# Patient Record
Sex: FEMALE | Race: OTHER | NOT HISPANIC OR LATINO | URBAN - METROPOLITAN AREA
[De-identification: names, ages, dates, MRNs, and addresses within clinical notes are randomized per-mention and may not be internally consistent; named-entity substitution may affect disease eponyms.]

---

## 2021-12-12 ENCOUNTER — EMERGENCY (EMERGENCY)
Facility: HOSPITAL | Age: 36
LOS: 1 days | Discharge: HOME | End: 2021-12-12
Attending: STUDENT IN AN ORGANIZED HEALTH CARE EDUCATION/TRAINING PROGRAM
Payer: MEDICAID

## 2021-12-12 VITALS
WEIGHT: 145.06 LBS | TEMPERATURE: 99 F | DIASTOLIC BLOOD PRESSURE: 84 MMHG | OXYGEN SATURATION: 99 % | HEIGHT: 62 IN | SYSTOLIC BLOOD PRESSURE: 127 MMHG | HEART RATE: 85 BPM | RESPIRATION RATE: 18 BRPM

## 2021-12-12 VITALS
SYSTOLIC BLOOD PRESSURE: 116 MMHG | RESPIRATION RATE: 16 BRPM | HEART RATE: 71 BPM | TEMPERATURE: 98 F | DIASTOLIC BLOOD PRESSURE: 74 MMHG | OXYGEN SATURATION: 99 %

## 2021-12-12 DIAGNOSIS — R45.89 OTHER SYMPTOMS AND SIGNS INVOLVING EMOTIONAL STATE: ICD-10-CM

## 2021-12-12 LAB
ALBUMIN SERPL ELPH-MCNC: 4.3 G/DL — SIGNIFICANT CHANGE UP (ref 3.3–5.2)
ALP SERPL-CCNC: 99 U/L — SIGNIFICANT CHANGE UP (ref 40–120)
ALT FLD-CCNC: 12 U/L — SIGNIFICANT CHANGE UP
ANION GAP SERPL CALC-SCNC: 12 MMOL/L — SIGNIFICANT CHANGE UP (ref 5–17)
APAP SERPL-MCNC: <3 UG/ML — LOW (ref 10–26)
AST SERPL-CCNC: 7 U/L — SIGNIFICANT CHANGE UP
BASOPHILS # BLD AUTO: 0.05 K/UL — SIGNIFICANT CHANGE UP (ref 0–0.2)
BASOPHILS NFR BLD AUTO: 0.6 % — SIGNIFICANT CHANGE UP (ref 0–2)
BILIRUB SERPL-MCNC: 0.2 MG/DL — LOW (ref 0.4–2)
BUN SERPL-MCNC: 7.3 MG/DL — LOW (ref 8–20)
CALCIUM SERPL-MCNC: 8.8 MG/DL — SIGNIFICANT CHANGE UP (ref 8.6–10.2)
CHLORIDE SERPL-SCNC: 104 MMOL/L — SIGNIFICANT CHANGE UP (ref 98–107)
CO2 SERPL-SCNC: 22 MMOL/L — SIGNIFICANT CHANGE UP (ref 22–29)
CREAT SERPL-MCNC: 0.58 MG/DL — SIGNIFICANT CHANGE UP (ref 0.5–1.3)
EOSINOPHIL # BLD AUTO: 0.04 K/UL — SIGNIFICANT CHANGE UP (ref 0–0.5)
EOSINOPHIL NFR BLD AUTO: 0.4 % — SIGNIFICANT CHANGE UP (ref 0–6)
ETHANOL SERPL-MCNC: <10 MG/DL — SIGNIFICANT CHANGE UP (ref 0–9)
GLUCOSE SERPL-MCNC: 96 MG/DL — SIGNIFICANT CHANGE UP (ref 70–99)
HCG SERPL-ACNC: <4 MIU/ML — SIGNIFICANT CHANGE UP
HCT VFR BLD CALC: 40.1 % — SIGNIFICANT CHANGE UP (ref 34.5–45)
HGB BLD-MCNC: 14 G/DL — SIGNIFICANT CHANGE UP (ref 11.5–15.5)
IMM GRANULOCYTES NFR BLD AUTO: 0.3 % — SIGNIFICANT CHANGE UP (ref 0–1.5)
LYMPHOCYTES # BLD AUTO: 2.4 K/UL — SIGNIFICANT CHANGE UP (ref 1–3.3)
LYMPHOCYTES # BLD AUTO: 26.8 % — SIGNIFICANT CHANGE UP (ref 13–44)
MCHC RBC-ENTMCNC: 30.2 PG — SIGNIFICANT CHANGE UP (ref 27–34)
MCHC RBC-ENTMCNC: 34.9 GM/DL — SIGNIFICANT CHANGE UP (ref 32–36)
MCV RBC AUTO: 86.6 FL — SIGNIFICANT CHANGE UP (ref 80–100)
MONOCYTES # BLD AUTO: 0.73 K/UL — SIGNIFICANT CHANGE UP (ref 0–0.9)
MONOCYTES NFR BLD AUTO: 8.2 % — SIGNIFICANT CHANGE UP (ref 2–14)
NEUTROPHILS # BLD AUTO: 5.69 K/UL — SIGNIFICANT CHANGE UP (ref 1.8–7.4)
NEUTROPHILS NFR BLD AUTO: 63.7 % — SIGNIFICANT CHANGE UP (ref 43–77)
PLATELET # BLD AUTO: 340 K/UL — SIGNIFICANT CHANGE UP (ref 150–400)
POTASSIUM SERPL-MCNC: 3.6 MMOL/L — SIGNIFICANT CHANGE UP (ref 3.5–5.3)
POTASSIUM SERPL-SCNC: 3.6 MMOL/L — SIGNIFICANT CHANGE UP (ref 3.5–5.3)
PROT SERPL-MCNC: 7 G/DL — SIGNIFICANT CHANGE UP (ref 6.6–8.7)
RBC # BLD: 4.63 M/UL — SIGNIFICANT CHANGE UP (ref 3.8–5.2)
RBC # FLD: 12 % — SIGNIFICANT CHANGE UP (ref 10.3–14.5)
SALICYLATES SERPL-MCNC: <0.6 MG/DL — LOW (ref 10–20)
SODIUM SERPL-SCNC: 138 MMOL/L — SIGNIFICANT CHANGE UP (ref 135–145)
WBC # BLD: 8.94 K/UL — SIGNIFICANT CHANGE UP (ref 3.8–10.5)
WBC # FLD AUTO: 8.94 K/UL — SIGNIFICANT CHANGE UP (ref 3.8–10.5)

## 2021-12-12 PROCEDURE — 80053 COMPREHEN METABOLIC PANEL: CPT

## 2021-12-12 PROCEDURE — 85025 COMPLETE CBC W/AUTO DIFF WBC: CPT

## 2021-12-12 PROCEDURE — 82962 GLUCOSE BLOOD TEST: CPT

## 2021-12-12 PROCEDURE — 84702 CHORIONIC GONADOTROPIN TEST: CPT

## 2021-12-12 PROCEDURE — 99284 EMERGENCY DEPT VISIT MOD MDM: CPT | Mod: 25

## 2021-12-12 PROCEDURE — 70450 CT HEAD/BRAIN W/O DYE: CPT | Mod: MA

## 2021-12-12 PROCEDURE — 71045 X-RAY EXAM CHEST 1 VIEW: CPT | Mod: 26

## 2021-12-12 PROCEDURE — T1013: CPT

## 2021-12-12 PROCEDURE — 70450 CT HEAD/BRAIN W/O DYE: CPT | Mod: 26,MA

## 2021-12-12 PROCEDURE — 90792 PSYCH DIAG EVAL W/MED SRVCS: CPT

## 2021-12-12 PROCEDURE — 99283 EMERGENCY DEPT VISIT LOW MDM: CPT

## 2021-12-12 PROCEDURE — 80307 DRUG TEST PRSMV CHEM ANLYZR: CPT

## 2021-12-12 PROCEDURE — 36415 COLL VENOUS BLD VENIPUNCTURE: CPT

## 2021-12-12 PROCEDURE — 71045 X-RAY EXAM CHEST 1 VIEW: CPT

## 2021-12-12 RX ORDER — SODIUM CHLORIDE 9 MG/ML
1000 INJECTION INTRAMUSCULAR; INTRAVENOUS; SUBCUTANEOUS ONCE
Refills: 0 | Status: COMPLETED | OUTPATIENT
Start: 2021-12-12 | End: 2021-12-12

## 2021-12-12 RX ADMIN — SODIUM CHLORIDE 1000 MILLILITER(S): 9 INJECTION INTRAMUSCULAR; INTRAVENOUS; SUBCUTANEOUS at 14:44

## 2021-12-12 NOTE — ED PROVIDER NOTE - OBJECTIVE STATEMENT
35yo F unknown pmhx pw ams. as per ems notes that pt called a friend and a friend called the ambulance. pts roomates saw a demi exit her room earlier this morning? pt denies taking drugs/etoh. denies SI. states that takes sleeping pills (Foskrol). denies taking extra. falls asleep during conversation ; moving all extremeties. admits to mild ha.  Denies f/c/n/v/cp/sob/palpitations/ cough/rash//dizziness/abd.pain/d/c/dysuria/hematuria

## 2021-12-12 NOTE — ED BEHAVIORAL HEALTH ASSESSMENT NOTE - NSSUICPROTFACT_PSY_ALL_CORE
Responsibility to children, family, or others/Identifies reasons for living/Supportive social network of family or friends/Fear of death or the actual act of killing self/Cultural, spiritual and/or moral attitudes against suicide

## 2021-12-12 NOTE — ED ADULT NURSE REASSESSMENT NOTE - NS ED NURSE REASSESS COMMENT FT1
Assumed care of patient from previous RN. Patient to be evaluated by .  at bedside. Patient protecting airway, calm and cooperative but is requesting to leave. Safety maintained, needs attended, will continue to monitor.

## 2021-12-12 NOTE — ED PROVIDER NOTE - PATIENT PORTAL LINK FT
You can access the FollowMyHealth Patient Portal offered by [facility] by registering at the following website: [email or web address]. By joining [facility]’s FollowMyHealth portal, you will also be able to view your health information using other applications (apps) compatible with our system.

## 2021-12-12 NOTE — ED BEHAVIORAL HEALTH ASSESSMENT NOTE - DESCRIPTION
none T(C): 37.3 (12-12-21 @ 13:43), Max: 37.3 (12-12-21 @ 13:43)  T(F): 99.2 (12-12-21 @ 13:43), Max: 99.2 (12-12-21 @ 13:43)  HR: 85 (12-12-21 @ 13:43) (85 - 85)  BP: 127/84 (12-12-21 @ 13:43) (127/84 - 127/84)  RR: 18 (12-12-21 @ 13:43) (18 - 18)  SpO2: 99% (12-12-21 @ 13:43) (99% - 99%) moved to [place] 6 mo ago with  and 3 children, employed in nursery

## 2021-12-12 NOTE — ED PROVIDER NOTE - CLINICAL SUMMARY MEDICAL DECISION MAKING FREE TEXT BOX
ams; denies drug/alcohol; denies si; very sleepy denies any symptoms besides mild headache will do priority ct; labs hydrate ekg --reassess

## 2021-12-12 NOTE — ED PROVIDER NOTE - PROGRESS NOTE DETAILS
bran: pt aao x 3 denies si notes that has been feeling stressed lately and took 3-4 pills of foskrol suppliments that she takes for sleep; no intention to hurt herself; asking to speak to psych for anxiety/depression; no complaints currently As per signout from Dr. Vasques, patient pending psychiatric consultation.

## 2021-12-12 NOTE — ED ADULT TRIAGE NOTE - CHIEF COMPLAINT QUOTE
patient was brought in unresponsive at home, responsive to painful stimuli, GCS of 12. MD called to bedside Priority CT called. no sign o f alisiam
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
## 2021-12-12 NOTE — CHART NOTE - NSCHARTNOTEFT_GEN_A_CORE
SW Note: SW made aware by  provider pt is T&R, would benefit from outpt f/u. SW met with pt at bedside with  reyna Bassett in agreement to intake at [facility], aware intake is via phone. Appt made for 12/14 at 1pm. # for appt is [phone]. Pt provided with appt card and brochure, no further SW services identified

## 2021-12-12 NOTE — ED PROVIDER NOTE - NSFOLLOWUPINSTRUCTIONS_ED_ALL_ED_FT
1) Seguimiento según lo recomendado  2) Regrese a la burt de emergencias si los síntomas empeoran o le preocupan    1) Follow up as recommended  2) Return to the ER for worsening or concerning symptoms      La depresión    LO QUE NECESITA SABER:    La depresión es un trastorno médico que causa sentimientos de tristeza o desesperanza que no desaparecen. La depresión puede causar que usted pierda interés en las cosas que antes disfrutaba. Estos sentimientos pueden llegar a interferir con brown abimael diaria.    INSTRUCCIONES SOBRE EL ANUP HOSPITALARIA:    Llame al número de emergencias local (911 en [place]) si:  •Usted piensa en lastimarse o lastimar a alguien más.      •Usted ha hecho algo a propósito para hacerse daño.      Llame a brown terapeuta o médico si:  •Yareli síntomas no mejoran.      •No puede asistir a brown próxima tanika.      •Usted tiene síntomas nuevos.      •Usted tiene preguntas o inquietudes acerca de brown condición o cuidado.      Los siguientes recursos están disponibles en cualquier momento para ayudarlo, si es necesario:  •National Suicide Prevention Lifeline (línea de prevención del suicidio): 1-683.686.5479 (5-014-354-TALK)      •Teléfono directo para hablar de suicidio: 1-534.136.4054 (0-733-PPSOLKO)      •Para obtener shawn lista de números internacionales: https://save.org/find-help/international-resources/      Medicamentos:  •Antidepresivospueden darse para mejorar o balancear brown estado de ánimo. Es posible que usted necesite marilee timothy medicamento por varias semanas antes de empezar a sentirse mejor.      •[place] yareli medicamentos verito se le haya indicado.Consulte con brown médico si usted miley que brown medicamento no le está ayudando o si presenta efectos secundarios. Infórmele si es alérgico a algún medicamento. Mantenga shawn lista actualizada de los medicamentos, las vitaminas y los productos herbales que mercedez. Incluya los siguientes datos de los medicamentos: cantidad, frecuencia y motivo de administración. Traiga con usted la lista o los envases de las píldoras a yareli citas de seguimiento. Lleve la lista de los medicamentos con usted en terri de shawn emergencia.      La terapiase utiliza a menudo junto con medicamentos para aliviar la depresión. La terapia es un lugar para hablar sobre yareli sentimientos y todo lo que puede estar causando la depresión. La terapia se puede realizar salomon o en alfonzo. También podría realizarse con yareli familiares o con brown miguel.    Cuidados personales:  •Realice actividad física regularmente.Trate de mantenerse activo por 30 minutos, de 3 a 5 días a la semana. La actividad física puede ayudar a aliviar la depresión. Colabore con brown médico para crear un plan de ejercicios que usted disfrute. Puede ayudarlo si le pide a alguien que se mantenga activo con usted.      •Establezca un horario regular para dormir.Shawn rutina puede ayudarlo a relajarse antes de irse a dormir. Escuche música, seth o sadia yoga. Trate de irse a dormir y despertarse al mismo tiempo todos los días. El sueño es importante para la ever emocional.      •Consuma alimentos saludables y variados.Los alimentos saludables incluyen frutas, verduras, panes integrales, productos lácteos descremados, geraldo magras, pescado y fríjoles. Un plan alimenticio saludable es bajo en grasas, sal y azúcar adicional.      •No tome alcohol ni use drogas.El alcohol y las drogas pueden empeorar la depresión. Hable con brown terapeuta o médico si usted necesita ayuda para dejar de fumar.      Acuda a yareli consultas de control con brown médico según le indicaron:Brown médico vigilará brown progreso en las citas de seguimiento. También monitoreará brown medicamento si usted está tomando antidepresivos. Brown médico le preguntará si el medicamento le está ayudando. Dígale sobre cualquier efecto secundario o problemas que usted tenga con brown medicamento. Podría ser necesario cambiar el tipo o cantidad de medicamento. Anote yareli preguntas para que se acuerde de hacerlas fadumo yareli visitas.      Depression    WHAT YOU NEED TO KNOW:    Depression is a medical condition that causes feelings of sadness or hopelessness that do not go away. Depression may cause you to lose interest in things you used to enjoy. These feelings may interfere with your daily life.    DISCHARGE INSTRUCTIONS:    Call your local emergency number (911 in the ) if:   •You think about harming yourself or someone else.      •You have done something on purpose to hurt yourself.      Call your therapist or doctor if:   •Your symptoms do not improve.      •You cannot make it to your next appointment.       •You have new symptoms.      •You have questions or concerns about your condition or care.      The following resources are available at any time to help you, if needed:   •National Suicide Prevention Lifeline: 1-619.753.5910 (7-407-983-TALK)      •Suicide Hotline: 1-467.877.6605 (3-089-REQTUDD)      •For a list of international numbers: https://save.org/find-help/international-resources/      Medicines:   •Antidepressants may be given to improve or balance your mood. You may need to take this medicine for several weeks before you begin to feel better.      •Take your medicine as directed. Contact your healthcare provider if you think your medicine is not helping or if you have side effects. Tell him of her if you are allergic to any medicine. Keep a list of the medicines, vitamins, and herbs you take. Include the amounts, and when and why you take them. Bring the list or the pill bottles to follow-up visits. Carry your medicine list with you in case of an emergency.      Therapy is often used together with medicine to relieve depression. Therapy is a way for you to talk about your feelings and anything that may be causing depression. Therapy can be done alone or in a group. It may also be done with family members or a significant other.    Self-care:   •Get regular physical activity. Try to be active for 30 minutes, 3 to 5 days a week. Physical activity can help relieve depression. Work with your healthcare provider to develop a plan that you enjoy. It may help to ask someone to be active with you.      •Create a regular sleep schedule. A routine can help you relax before bed. Listen to music, read, or do yoga. Try to go to bed and wake up at the same time every day. Sleep is important for emotional health.      •Eat a variety of healthy foods. Healthy foods include fruits, vegetables, whole-grain breads, low-fat dairy products, lean meats, fish, and cooked beans. A healthy meal plan is low in fat, salt, and added sugar.      •Do not drink alcohol or use drugs. Alcohol and drugs can make depression worse. Talk to your therapist or doctor if you need help quitting.      Follow up with your healthcare provider as directed: Your healthcare provider will monitor your progress at follow-up visits. He or she will also monitor your medicine if you take antidepressants. Your healthcare provider will ask if the medicine is helping. Tell him or her about any side effects or problems you may have with your medicine. The type or amount of medicine may need to be changed. Write down your questions so you remember to ask them during your visits.

## 2021-12-12 NOTE — ED PROVIDER NOTE - PHYSICAL EXAMINATION
Head: atraumatic, normacephalic  Face: atraumatic, no crepitus no orbiral/maxillary/mandibular ttp  throat: uvula midline no exudates  eyes: perrla eomi  heart: rrr s1s2  lungs: ctab  abd: soft, nt nd +bs no rebound/guarding no cva ttp  skin: warm  LE: no swelling, no calf ttp  back: no midline cervical/thoracic/lumbar ttp  neuro: moving all extremeties

## 2022-03-27 ENCOUNTER — EMERGENCY (EMERGENCY)
Facility: HOSPITAL | Age: 37
LOS: 1 days | Discharge: HOME | End: 2022-03-27
Attending: EMERGENCY MEDICINE
Payer: MEDICAID

## 2022-03-27 VITALS
TEMPERATURE: 97 F | HEART RATE: 100 BPM | SYSTOLIC BLOOD PRESSURE: 113 MMHG | RESPIRATION RATE: 17 BRPM | WEIGHT: 145.95 LBS | HEIGHT: 62 IN | OXYGEN SATURATION: 99 % | DIASTOLIC BLOOD PRESSURE: 78 MMHG

## 2022-03-27 VITALS
TEMPERATURE: 98 F | SYSTOLIC BLOOD PRESSURE: 110 MMHG | DIASTOLIC BLOOD PRESSURE: 60 MMHG | RESPIRATION RATE: 22 BRPM | OXYGEN SATURATION: 100 % | HEART RATE: 94 BPM

## 2022-03-27 LAB
ALBUMIN SERPL ELPH-MCNC: 4.4 G/DL — SIGNIFICANT CHANGE UP (ref 3.3–5.2)
ALP SERPL-CCNC: 94 U/L — SIGNIFICANT CHANGE UP (ref 40–120)
ALT FLD-CCNC: 12 U/L — SIGNIFICANT CHANGE UP
ANION GAP SERPL CALC-SCNC: 16 MMOL/L — SIGNIFICANT CHANGE UP (ref 5–17)
APPEARANCE UR: ABNORMAL
AST SERPL-CCNC: 12 U/L — SIGNIFICANT CHANGE UP
BACTERIA # UR AUTO: ABNORMAL
BASOPHILS # BLD AUTO: 0.03 K/UL — SIGNIFICANT CHANGE UP (ref 0–0.2)
BASOPHILS NFR BLD AUTO: 0.3 % — SIGNIFICANT CHANGE UP (ref 0–2)
BILIRUB SERPL-MCNC: 0.4 MG/DL — SIGNIFICANT CHANGE UP (ref 0.4–2)
BILIRUB UR-MCNC: NEGATIVE — SIGNIFICANT CHANGE UP
BUN SERPL-MCNC: 9.1 MG/DL — SIGNIFICANT CHANGE UP (ref 8–20)
CALCIUM SERPL-MCNC: 9 MG/DL — SIGNIFICANT CHANGE UP (ref 8.6–10.2)
CHLORIDE SERPL-SCNC: 101 MMOL/L — SIGNIFICANT CHANGE UP (ref 98–107)
CO2 SERPL-SCNC: 21 MMOL/L — LOW (ref 22–29)
COLOR SPEC: YELLOW — SIGNIFICANT CHANGE UP
CREAT SERPL-MCNC: 0.57 MG/DL — SIGNIFICANT CHANGE UP (ref 0.5–1.3)
DIFF PNL FLD: ABNORMAL
EGFR: 121 ML/MIN/1.73M2 — SIGNIFICANT CHANGE UP
EOSINOPHIL # BLD AUTO: 0.04 K/UL — SIGNIFICANT CHANGE UP (ref 0–0.5)
EOSINOPHIL NFR BLD AUTO: 0.4 % — SIGNIFICANT CHANGE UP (ref 0–6)
EPI CELLS # UR: ABNORMAL
GLUCOSE SERPL-MCNC: 101 MG/DL — HIGH (ref 70–99)
GLUCOSE UR QL: NEGATIVE — SIGNIFICANT CHANGE UP
HCG SERPL-ACNC: <4 MIU/ML — SIGNIFICANT CHANGE UP
HCT VFR BLD CALC: 41.9 % — SIGNIFICANT CHANGE UP (ref 34.5–45)
HGB BLD-MCNC: 14.5 G/DL — SIGNIFICANT CHANGE UP (ref 11.5–15.5)
IMM GRANULOCYTES NFR BLD AUTO: 0.4 % — SIGNIFICANT CHANGE UP (ref 0–1.5)
KETONES UR-MCNC: ABNORMAL
LEUKOCYTE ESTERASE UR-ACNC: ABNORMAL
LIDOCAIN IGE QN: 18 U/L — LOW (ref 22–51)
LYMPHOCYTES # BLD AUTO: 2.39 K/UL — SIGNIFICANT CHANGE UP (ref 1–3.3)
LYMPHOCYTES # BLD AUTO: 21.5 % — SIGNIFICANT CHANGE UP (ref 13–44)
MCHC RBC-ENTMCNC: 30.7 PG — SIGNIFICANT CHANGE UP (ref 27–34)
MCHC RBC-ENTMCNC: 34.6 GM/DL — SIGNIFICANT CHANGE UP (ref 32–36)
MCV RBC AUTO: 88.6 FL — SIGNIFICANT CHANGE UP (ref 80–100)
MONOCYTES # BLD AUTO: 0.68 K/UL — SIGNIFICANT CHANGE UP (ref 0–0.9)
MONOCYTES NFR BLD AUTO: 6.1 % — SIGNIFICANT CHANGE UP (ref 2–14)
NEUTROPHILS # BLD AUTO: 7.94 K/UL — HIGH (ref 1.8–7.4)
NEUTROPHILS NFR BLD AUTO: 71.3 % — SIGNIFICANT CHANGE UP (ref 43–77)
NITRITE UR-MCNC: POSITIVE
PH UR: 6 — SIGNIFICANT CHANGE UP (ref 5–8)
PLATELET # BLD AUTO: 384 K/UL — SIGNIFICANT CHANGE UP (ref 150–400)
POTASSIUM SERPL-MCNC: 4.5 MMOL/L — SIGNIFICANT CHANGE UP (ref 3.5–5.3)
POTASSIUM SERPL-SCNC: 4.5 MMOL/L — SIGNIFICANT CHANGE UP (ref 3.5–5.3)
PROT SERPL-MCNC: 7.6 G/DL — SIGNIFICANT CHANGE UP (ref 6.6–8.7)
PROT UR-MCNC: NEGATIVE — SIGNIFICANT CHANGE UP
RBC # BLD: 4.73 M/UL — SIGNIFICANT CHANGE UP (ref 3.8–5.2)
RBC # FLD: 12.3 % — SIGNIFICANT CHANGE UP (ref 10.3–14.5)
RBC CASTS # UR COMP ASSIST: >50 /HPF (ref 0–4)
SODIUM SERPL-SCNC: 137 MMOL/L — SIGNIFICANT CHANGE UP (ref 135–145)
SP GR SPEC: 1.02 — SIGNIFICANT CHANGE UP (ref 1.01–1.02)
UROBILINOGEN FLD QL: NEGATIVE — SIGNIFICANT CHANGE UP
WBC # BLD: 11.12 K/UL — HIGH (ref 3.8–10.5)
WBC # FLD AUTO: 11.12 K/UL — HIGH (ref 3.8–10.5)
WBC UR QL: SIGNIFICANT CHANGE UP /HPF (ref 0–5)

## 2022-03-27 PROCEDURE — 85025 COMPLETE CBC W/AUTO DIFF WBC: CPT

## 2022-03-27 PROCEDURE — 80053 COMPREHEN METABOLIC PANEL: CPT

## 2022-03-27 PROCEDURE — 83690 ASSAY OF LIPASE: CPT

## 2022-03-27 PROCEDURE — 81001 URINALYSIS AUTO W/SCOPE: CPT

## 2022-03-27 PROCEDURE — 96361 HYDRATE IV INFUSION ADD-ON: CPT

## 2022-03-27 PROCEDURE — 36415 COLL VENOUS BLD VENIPUNCTURE: CPT

## 2022-03-27 PROCEDURE — 96375 TX/PRO/DX INJ NEW DRUG ADDON: CPT

## 2022-03-27 PROCEDURE — 99285 EMERGENCY DEPT VISIT HI MDM: CPT

## 2022-03-27 PROCEDURE — 87086 URINE CULTURE/COLONY COUNT: CPT

## 2022-03-27 PROCEDURE — 96374 THER/PROPH/DIAG INJ IV PUSH: CPT

## 2022-03-27 PROCEDURE — 99284 EMERGENCY DEPT VISIT MOD MDM: CPT | Mod: 25

## 2022-03-27 PROCEDURE — 84702 CHORIONIC GONADOTROPIN TEST: CPT

## 2022-03-27 PROCEDURE — 76705 ECHO EXAM OF ABDOMEN: CPT

## 2022-03-27 PROCEDURE — 76705 ECHO EXAM OF ABDOMEN: CPT | Mod: 26

## 2022-03-27 RX ORDER — ONDANSETRON 8 MG/1
4 TABLET, FILM COATED ORAL ONCE
Refills: 0 | Status: COMPLETED | OUTPATIENT
Start: 2022-03-27 | End: 2022-03-27

## 2022-03-27 RX ORDER — MORPHINE SULFATE 50 MG/1
2 CAPSULE, EXTENDED RELEASE ORAL ONCE
Refills: 0 | Status: DISCONTINUED | OUTPATIENT
Start: 2022-03-27 | End: 2022-03-27

## 2022-03-27 RX ORDER — SODIUM CHLORIDE 9 MG/ML
1000 INJECTION INTRAMUSCULAR; INTRAVENOUS; SUBCUTANEOUS ONCE
Refills: 0 | Status: COMPLETED | OUTPATIENT
Start: 2022-03-27 | End: 2022-03-27

## 2022-03-27 RX ORDER — CEPHALEXIN 500 MG
1 CAPSULE ORAL
Qty: 14 | Refills: 0
Start: 2022-03-27 | End: 2022-04-02

## 2022-03-27 RX ADMIN — SODIUM CHLORIDE 1000 MILLILITER(S): 9 INJECTION INTRAMUSCULAR; INTRAVENOUS; SUBCUTANEOUS at 12:24

## 2022-03-27 RX ADMIN — ONDANSETRON 4 MILLIGRAM(S): 8 TABLET, FILM COATED ORAL at 12:25

## 2022-03-27 RX ADMIN — MORPHINE SULFATE 2 MILLIGRAM(S): 50 CAPSULE, EXTENDED RELEASE ORAL at 12:24

## 2022-03-27 RX ADMIN — MORPHINE SULFATE 2 MILLIGRAM(S): 50 CAPSULE, EXTENDED RELEASE ORAL at 12:40

## 2022-03-27 RX ADMIN — SODIUM CHLORIDE 1000 MILLILITER(S): 9 INJECTION INTRAMUSCULAR; INTRAVENOUS; SUBCUTANEOUS at 13:30

## 2022-03-27 NOTE — ED ADULT NURSE NOTE - OBJECTIVE STATEMENT
Pt c/o abdominal pain for 4 days. Pt woke up with pain at 8/10 with nausea and vomiting. Denies diarrhea.

## 2022-03-27 NOTE — ED PROVIDER NOTE - NS ED ATTENDING STATEMENT MOD
This was a shared visit with the SANDRA. I reviewed and verified the documentation and independently performed the documented:

## 2022-03-27 NOTE — ED PROVIDER NOTE - OBJECTIVE STATEMENT
36 yo F Pt, no PmHX, presents to ED complaining of upper abdominal pain x 4 days. Pt states she has upper abdominal pain, with associated nausea and vomiting. Pt admits to additional lower abdominal cramping but states its likely due to her period. Pt also has associated fevers, unknown temp. Pt denies diarrhea, blood in stool, constipation, urinary symptoms, head ache, chest pain, SOB< and any other acute symptoms at this time.

## 2022-03-27 NOTE — ED PROVIDER NOTE - PATIENT PORTAL LINK FT
You can access the FollowMyHealth Patient Portal offered by [facility] by registering at the following website: [email or web address]. By joining [facility]’s FollowMyHealth portal, you will also be able to view your health information using other applications (apps) compatible with our system.

## 2022-03-27 NOTE — ED PROVIDER NOTE - ATTENDING CONTRIBUTION TO CARE
Je HARPER- 38 Y/O F WITH H/O TUBAL LIGATION p/w rt upper abd pain with nausea, vomiting for 4 days, no fever, chills, dysuria. Pt started on her periods 2 days ago and also has mild pelvic pain like her periods. No h/o gallstones, denies pregnancy. No recent travel or sick contact.    Pt is alert, well appearing female, s1s2 normal reg, b/l clear breath sounds, abd soft, nt, nd, neg Castellanos's sign, normal bowel sounds, no cvat b/l, neuro exam aox3, cn 2-12 intact, no focal deficits, skin warm, dry, good turgor    plan to do us gallbladder, check labs, treat symptomatically, ddx includes biliary colic, pancreatitis, gastritis, dysmenorrhea

## 2022-03-27 NOTE — ED PROVIDER NOTE - NSFOLLOWUPINSTRUCTIONS_ED_ALL_ED_FT
Urinary Tract Infection    A urinary tract infection (UTI) is an infection of any part of the urinary tract, which includes the kidneys, ureters, bladder, and urethra. Risk factors include ignoring your need to urinate, wiping back to front if female, being an uncircumcised male, and having diabetes or a weak immune system. Symptoms include frequent urination, pain or burning with urination, foul smelling urine, cloudy urine, pain in the lower abdomen, blood in the urine, and fever. If you were prescribed an antibiotic medicine, take it as told by your health care provider. Do not stop taking the antibiotic even if you start to feel better.    SEEK IMMEDIATE MEDICAL CARE IF YOU HAVE ANY OF THE FOLLOWING SYMPTOMS: severe back or abdominal pain, fever, inability to keep fluids or medicine down, dizziness/lightheadedness, or a change in mental status.    Please return to the emergency department immediately should you feel worse in any way or have any of the following symptoms:    •	especially increased or different pain  •	 fevers  •	persistent vomiting  •	shaking chills    Please return to the emergency department for a recheck in 12 hours so we can re-evaluate you and ensure that you are not developing a problem that would require surgery or hospitalization.      Please follow up with the Doctor listed within the time frame specified. Thank you for coming to the emergency department. We hope you are feeling improved and continue to get better. Have a nice day.

## 2022-03-28 LAB
CULTURE RESULTS: SIGNIFICANT CHANGE UP
SPECIMEN SOURCE: SIGNIFICANT CHANGE UP